# Patient Record
Sex: FEMALE | Race: WHITE | NOT HISPANIC OR LATINO | Employment: OTHER | URBAN - METROPOLITAN AREA
[De-identification: names, ages, dates, MRNs, and addresses within clinical notes are randomized per-mention and may not be internally consistent; named-entity substitution may affect disease eponyms.]

---

## 2023-01-01 ENCOUNTER — HOSPITAL ENCOUNTER (EMERGENCY)
Facility: CLINIC | Age: 53
Discharge: ANOTHER HOSPITAL | End: 2023-09-03
Attending: EMERGENCY MEDICINE | Admitting: EMERGENCY MEDICINE
Payer: COMMERCIAL

## 2023-01-01 ENCOUNTER — APPOINTMENT (OUTPATIENT)
Dept: CT IMAGING | Facility: CLINIC | Age: 53
End: 2023-01-01
Attending: EMERGENCY MEDICINE
Payer: COMMERCIAL

## 2023-01-01 ENCOUNTER — APPOINTMENT (OUTPATIENT)
Dept: GENERAL RADIOLOGY | Facility: CLINIC | Age: 53
End: 2023-01-01
Attending: EMERGENCY MEDICINE
Payer: COMMERCIAL

## 2023-01-01 VITALS
DIASTOLIC BLOOD PRESSURE: 90 MMHG | TEMPERATURE: 98.6 F | SYSTOLIC BLOOD PRESSURE: 128 MMHG | WEIGHT: 158 LBS | RESPIRATION RATE: 33 BRPM | OXYGEN SATURATION: 95 % | HEART RATE: 90 BPM

## 2023-01-01 DIAGNOSIS — I26.99 ACUTE PULMONARY EMBOLISM, UNSPECIFIED PULMONARY EMBOLISM TYPE, UNSPECIFIED WHETHER ACUTE COR PULMONALE PRESENT (H): ICD-10-CM

## 2023-01-01 DIAGNOSIS — J96.01 ACUTE RESPIRATORY FAILURE WITH HYPOXIA (H): ICD-10-CM

## 2023-01-01 DIAGNOSIS — J18.9 PNEUMONIA OF BOTH LUNGS DUE TO INFECTIOUS ORGANISM, UNSPECIFIED PART OF LUNG: ICD-10-CM

## 2023-01-01 LAB
ALBUMIN SERPL BCG-MCNC: 2.7 G/DL (ref 3.5–5.2)
ALP SERPL-CCNC: 227 U/L (ref 35–104)
ALT SERPL W P-5'-P-CCNC: 64 U/L (ref 0–50)
ANION GAP SERPL CALCULATED.3IONS-SCNC: 13 MMOL/L (ref 7–15)
AST SERPL W P-5'-P-CCNC: 118 U/L (ref 0–45)
BASE EXCESS BLDV CALC-SCNC: 3.3 MMOL/L (ref -7.7–1.9)
BASOPHILS # BLD AUTO: 0 10E3/UL (ref 0–0.2)
BASOPHILS NFR BLD AUTO: 0 %
BILIRUB SERPL-MCNC: 1 MG/DL
BUN SERPL-MCNC: 20.2 MG/DL (ref 6–20)
CALCIUM SERPL-MCNC: 8.9 MG/DL (ref 8.6–10)
CHLORIDE SERPL-SCNC: 100 MMOL/L (ref 98–107)
CREAT SERPL-MCNC: 0.9 MG/DL (ref 0.51–0.95)
D DIMER PPP FEU-MCNC: 1.16 UG/ML FEU (ref 0–0.5)
DEPRECATED HCO3 PLAS-SCNC: 24 MMOL/L (ref 22–29)
EOSINOPHIL # BLD AUTO: 0 10E3/UL (ref 0–0.7)
EOSINOPHIL NFR BLD AUTO: 0 %
ERYTHROCYTE [DISTWIDTH] IN BLOOD BY AUTOMATED COUNT: 17.7 % (ref 10–15)
FLUAV RNA SPEC QL NAA+PROBE: NEGATIVE
FLUBV RNA RESP QL NAA+PROBE: NEGATIVE
GFR SERPL CREATININE-BSD FRML MDRD: 76 ML/MIN/1.73M2
GLUCOSE SERPL-MCNC: 127 MG/DL (ref 70–99)
HCO3 BLDV-SCNC: 28 MMOL/L (ref 21–28)
HCT VFR BLD AUTO: 36.4 % (ref 35–47)
HGB BLD-MCNC: 11.6 G/DL (ref 11.7–15.7)
HOLD SPECIMEN: NORMAL
IMM GRANULOCYTES # BLD: 0.1 10E3/UL
IMM GRANULOCYTES NFR BLD: 1 %
INR PPP: 1.14 (ref 0.85–1.15)
LYMPHOCYTES # BLD AUTO: 0.7 10E3/UL (ref 0.8–5.3)
LYMPHOCYTES NFR BLD AUTO: 9 %
MAGNESIUM SERPL-MCNC: 1.9 MG/DL (ref 1.7–2.3)
MCH RBC QN AUTO: 31.8 PG (ref 26.5–33)
MCHC RBC AUTO-ENTMCNC: 31.9 G/DL (ref 31.5–36.5)
MCV RBC AUTO: 100 FL (ref 78–100)
MONOCYTES # BLD AUTO: 1 10E3/UL (ref 0–1.3)
MONOCYTES NFR BLD AUTO: 13 %
NEUTROPHILS # BLD AUTO: 6.1 10E3/UL (ref 1.6–8.3)
NEUTROPHILS NFR BLD AUTO: 77 %
NRBC # BLD AUTO: 0.1 10E3/UL
NRBC BLD AUTO-RTO: 1 /100
NT-PROBNP SERPL-MCNC: 6687 PG/ML (ref 0–900)
O2/TOTAL GAS SETTING VFR VENT: 50 %
PCO2 BLDV: 44 MM HG (ref 40–50)
PH BLDV: 7.41 [PH] (ref 7.32–7.43)
PLATELET # BLD AUTO: 208 10E3/UL (ref 150–450)
PO2 BLDV: 35 MM HG (ref 25–47)
POTASSIUM SERPL-SCNC: 4.8 MMOL/L (ref 3.4–5.3)
PROCALCITONIN SERPL IA-MCNC: 0.26 NG/ML
PROT SERPL-MCNC: 5.6 G/DL (ref 6.4–8.3)
RADIOLOGIST FLAGS: ABNORMAL
RBC # BLD AUTO: 3.65 10E6/UL (ref 3.8–5.2)
RSV RNA SPEC NAA+PROBE: NEGATIVE
SARS-COV-2 RNA RESP QL NAA+PROBE: NEGATIVE
SODIUM SERPL-SCNC: 137 MMOL/L (ref 136–145)
TROPONIN T SERPL HS-MCNC: 148 NG/L
TSH SERPL DL<=0.005 MIU/L-ACNC: 0.94 UIU/ML (ref 0.3–4.2)
WBC # BLD AUTO: 7.9 10E3/UL (ref 4–11)

## 2023-01-01 PROCEDURE — 250N000009 HC RX 250: Performed by: EMERGENCY MEDICINE

## 2023-01-01 PROCEDURE — 83880 ASSAY OF NATRIURETIC PEPTIDE: CPT | Performed by: EMERGENCY MEDICINE

## 2023-01-01 PROCEDURE — 96368 THER/DIAG CONCURRENT INF: CPT

## 2023-01-01 PROCEDURE — 84443 ASSAY THYROID STIM HORMONE: CPT | Performed by: EMERGENCY MEDICINE

## 2023-01-01 PROCEDURE — 84145 PROCALCITONIN (PCT): CPT | Performed by: EMERGENCY MEDICINE

## 2023-01-01 PROCEDURE — 84484 ASSAY OF TROPONIN QUANT: CPT | Performed by: EMERGENCY MEDICINE

## 2023-01-01 PROCEDURE — 85379 FIBRIN DEGRADATION QUANT: CPT | Performed by: EMERGENCY MEDICINE

## 2023-01-01 PROCEDURE — 96375 TX/PRO/DX INJ NEW DRUG ADDON: CPT | Mod: 59

## 2023-01-01 PROCEDURE — 85025 COMPLETE CBC W/AUTO DIFF WBC: CPT | Performed by: EMERGENCY MEDICINE

## 2023-01-01 PROCEDURE — 71275 CT ANGIOGRAPHY CHEST: CPT

## 2023-01-01 PROCEDURE — 94640 AIRWAY INHALATION TREATMENT: CPT

## 2023-01-01 PROCEDURE — 999N000123 HC STATISTIC OXYGEN O2DAILY TECH TIME

## 2023-01-01 PROCEDURE — 99291 CRITICAL CARE FIRST HOUR: CPT | Mod: 25

## 2023-01-01 PROCEDURE — 96366 THER/PROPH/DIAG IV INF ADDON: CPT

## 2023-01-01 PROCEDURE — 93010 ELECTROCARDIOGRAM REPORT: CPT | Performed by: EMERGENCY MEDICINE

## 2023-01-01 PROCEDURE — 36415 COLL VENOUS BLD VENIPUNCTURE: CPT | Performed by: EMERGENCY MEDICINE

## 2023-01-01 PROCEDURE — 999N000157 HC STATISTIC RCP TIME EA 10 MIN

## 2023-01-01 PROCEDURE — 85610 PROTHROMBIN TIME: CPT | Performed by: EMERGENCY MEDICINE

## 2023-01-01 PROCEDURE — 96365 THER/PROPH/DIAG IV INF INIT: CPT | Mod: 59

## 2023-01-01 PROCEDURE — 83735 ASSAY OF MAGNESIUM: CPT | Performed by: EMERGENCY MEDICINE

## 2023-01-01 PROCEDURE — 93005 ELECTROCARDIOGRAM TRACING: CPT

## 2023-01-01 PROCEDURE — 250N000009 HC RX 250

## 2023-01-01 PROCEDURE — 82803 BLOOD GASES ANY COMBINATION: CPT | Performed by: EMERGENCY MEDICINE

## 2023-01-01 PROCEDURE — 99292 CRITICAL CARE ADDL 30 MIN: CPT

## 2023-01-01 PROCEDURE — 999N000156 HC STATISTIC RCP CONSULT EA 30 MIN

## 2023-01-01 PROCEDURE — 87637 SARSCOV2&INF A&B&RSV AMP PRB: CPT | Performed by: EMERGENCY MEDICINE

## 2023-01-01 PROCEDURE — 250N000011 HC RX IP 250 OP 636: Performed by: EMERGENCY MEDICINE

## 2023-01-01 PROCEDURE — 99285 EMERGENCY DEPT VISIT HI MDM: CPT | Mod: 25 | Performed by: EMERGENCY MEDICINE

## 2023-01-01 PROCEDURE — 71045 X-RAY EXAM CHEST 1 VIEW: CPT

## 2023-01-01 PROCEDURE — 94660 CPAP INITIATION&MGMT: CPT

## 2023-01-01 PROCEDURE — 80053 COMPREHEN METABOLIC PANEL: CPT | Performed by: EMERGENCY MEDICINE

## 2023-01-01 RX ORDER — IPRATROPIUM BROMIDE AND ALBUTEROL SULFATE 2.5; .5 MG/3ML; MG/3ML
3 SOLUTION RESPIRATORY (INHALATION)
Status: COMPLETED | OUTPATIENT
Start: 2023-01-01 | End: 2023-01-01

## 2023-01-01 RX ORDER — IOPAMIDOL 755 MG/ML
500 INJECTION, SOLUTION INTRAVASCULAR ONCE
Status: COMPLETED | OUTPATIENT
Start: 2023-01-01 | End: 2023-01-01

## 2023-01-01 RX ORDER — PREGABALIN 100 MG/1
100 CAPSULE ORAL 2 TIMES DAILY
COMMUNITY
Start: 2023-01-01

## 2023-01-01 RX ORDER — FUROSEMIDE 10 MG/ML
40 INJECTION INTRAMUSCULAR; INTRAVENOUS ONCE
Status: COMPLETED | OUTPATIENT
Start: 2023-01-01 | End: 2023-01-01

## 2023-01-01 RX ORDER — SENNOSIDES A AND B 8.6 MG/1
8.6 TABLET, FILM COATED ORAL DAILY
COMMUNITY
Start: 2023-01-01

## 2023-01-01 RX ORDER — ONDANSETRON 2 MG/ML
8 INJECTION INTRAMUSCULAR; INTRAVENOUS ONCE
Status: COMPLETED | OUTPATIENT
Start: 2023-01-01 | End: 2023-01-01

## 2023-01-01 RX ORDER — HYDROMORPHONE HYDROCHLORIDE 2 MG/1
2 TABLET ORAL EVERY 6 HOURS PRN
COMMUNITY
Start: 2023-01-01

## 2023-01-01 RX ORDER — AZITHROMYCIN 500 MG/1
500 INJECTION, POWDER, LYOPHILIZED, FOR SOLUTION INTRAVENOUS EVERY 24 HOURS
Status: COMPLETED | OUTPATIENT
Start: 2023-01-01 | End: 2023-01-01

## 2023-01-01 RX ORDER — MEMANTINE HYDROCHLORIDE 5 MG/1
5 TABLET ORAL 2 TIMES DAILY
COMMUNITY
Start: 2023-01-01 | End: 2024-03-08

## 2023-01-01 RX ORDER — IPRATROPIUM BROMIDE AND ALBUTEROL SULFATE 2.5; .5 MG/3ML; MG/3ML
3 SOLUTION RESPIRATORY (INHALATION) EVERY 4 HOURS PRN
Status: DISCONTINUED | OUTPATIENT
Start: 2023-01-01 | End: 2023-01-01 | Stop reason: HOSPADM

## 2023-01-01 RX ORDER — PROCHLORPERAZINE MALEATE 10 MG
10 TABLET ORAL EVERY 6 HOURS PRN
COMMUNITY
Start: 2023-01-01 | End: 2024-06-25

## 2023-01-01 RX ORDER — ACETAMINOPHEN 500 MG
500 TABLET ORAL EVERY 4 HOURS PRN
COMMUNITY

## 2023-01-01 RX ORDER — DEXAMETHASONE SODIUM PHOSPHATE 10 MG/ML
10 INJECTION, SOLUTION INTRAMUSCULAR; INTRAVENOUS ONCE
Status: COMPLETED | OUTPATIENT
Start: 2023-01-01 | End: 2023-01-01

## 2023-01-01 RX ORDER — DEXAMETHASONE 1 MG
1 TABLET ORAL DAILY
COMMUNITY
Start: 2023-01-01

## 2023-01-01 RX ORDER — IPRATROPIUM BROMIDE AND ALBUTEROL SULFATE 2.5; .5 MG/3ML; MG/3ML
SOLUTION RESPIRATORY (INHALATION)
Status: COMPLETED
Start: 2023-01-01 | End: 2023-01-01

## 2023-01-01 RX ORDER — EPINEPHRINE 0.3 MG/.3ML
0.3 INJECTION SUBCUTANEOUS PRN
COMMUNITY
Start: 2022-02-22

## 2023-01-01 RX ORDER — LIDOCAINE/PRILOCAINE 2.5 %-2.5%
CREAM (GRAM) TOPICAL PRN
COMMUNITY
Start: 2023-01-01

## 2023-01-01 RX ORDER — METHYLPREDNISOLONE SODIUM SUCCINATE 125 MG/2ML
125 INJECTION, POWDER, LYOPHILIZED, FOR SOLUTION INTRAMUSCULAR; INTRAVENOUS ONCE
Status: COMPLETED | OUTPATIENT
Start: 2023-01-01 | End: 2023-01-01

## 2023-01-01 RX ORDER — CEFEPIME HYDROCHLORIDE 2 G/1
2 INJECTION, POWDER, FOR SOLUTION INTRAVENOUS EVERY 8 HOURS
Status: DISCONTINUED | OUTPATIENT
Start: 2023-01-01 | End: 2023-01-01 | Stop reason: HOSPADM

## 2023-01-01 RX ORDER — HEPARIN SODIUM 10000 [USP'U]/100ML
0-5000 INJECTION, SOLUTION INTRAVENOUS CONTINUOUS
Status: DISCONTINUED | OUTPATIENT
Start: 2023-01-01 | End: 2023-01-01 | Stop reason: HOSPADM

## 2023-01-01 RX ORDER — CITALOPRAM HYDROBROMIDE 20 MG/1
20 TABLET ORAL DAILY
COMMUNITY
Start: 2023-01-01 | End: 2024-04-23

## 2023-01-01 RX ORDER — ONDANSETRON 8 MG/1
8 TABLET, FILM COATED ORAL EVERY 6 HOURS PRN
COMMUNITY
Start: 2023-01-01 | End: 2024-04-02

## 2023-01-01 RX ORDER — HYDROMORPHONE HYDROCHLORIDE 4 MG/1
4 TABLET ORAL EVERY 6 HOURS PRN
COMMUNITY
Start: 2023-01-01

## 2023-01-01 RX ORDER — FENTANYL 25 UG/1
2 PATCH TRANSDERMAL
COMMUNITY
Start: 2023-01-01

## 2023-01-01 RX ORDER — LIDOCAINE 50 MG/G
PATCH TOPICAL PRN
COMMUNITY
Start: 2022-01-01

## 2023-01-01 RX ADMIN — IPRATROPIUM BROMIDE AND ALBUTEROL SULFATE 3 ML: .5; 3 SOLUTION RESPIRATORY (INHALATION) at 09:39

## 2023-01-01 RX ADMIN — SODIUM CHLORIDE 70 ML: 9 INJECTION, SOLUTION INTRAVENOUS at 10:40

## 2023-01-01 RX ADMIN — HEPARIN SODIUM 1300 UNITS/HR: 10000 INJECTION, SOLUTION INTRAVENOUS at 11:48

## 2023-01-01 RX ADMIN — CEFEPIME HYDROCHLORIDE 2 G: 2 INJECTION, POWDER, FOR SOLUTION INTRAVENOUS at 10:53

## 2023-01-01 RX ADMIN — IOPAMIDOL 65 ML: 755 INJECTION, SOLUTION INTRAVENOUS at 10:41

## 2023-01-01 RX ADMIN — IPRATROPIUM BROMIDE AND ALBUTEROL SULFATE 3 ML: .5; 3 SOLUTION RESPIRATORY (INHALATION) at 09:44

## 2023-01-01 RX ADMIN — ONDANSETRON 8 MG: 2 INJECTION INTRAMUSCULAR; INTRAVENOUS at 12:39

## 2023-01-01 RX ADMIN — AZITHROMYCIN MONOHYDRATE 500 MG: 500 INJECTION, POWDER, LYOPHILIZED, FOR SOLUTION INTRAVENOUS at 10:17

## 2023-01-01 RX ADMIN — FUROSEMIDE 40 MG: 10 INJECTION, SOLUTION INTRAVENOUS at 10:55

## 2023-01-01 RX ADMIN — DEXAMETHASONE SODIUM PHOSPHATE 10 MG: 10 INJECTION, SOLUTION INTRAMUSCULAR; INTRAVENOUS at 11:37

## 2023-01-01 RX ADMIN — METHYLPREDNISOLONE SODIUM SUCCINATE 125 MG: 125 INJECTION INTRAMUSCULAR; INTRAVENOUS at 09:20

## 2023-01-01 RX ADMIN — IPRATROPIUM BROMIDE AND ALBUTEROL SULFATE 3 ML: .5; 3 SOLUTION RESPIRATORY (INHALATION) at 09:46

## 2023-01-01 ASSESSMENT — ACTIVITIES OF DAILY LIVING (ADL)
ADLS_ACUITY_SCORE: 35
ADLS_ACUITY_SCORE: 35

## 2023-09-03 NOTE — MEDICATION SCRIBE - ADMISSION MEDICATION HISTORY
Medication Scribe Admission Medication History    Admission medication history is complete. The information provided in this note is only as accurate as the sources available at the time of the update.    Medication reconciliation/reorder completed by provider prior to medication history? No    Information Source(s): Family member Spouse Claude via in-person    Pertinent Information:      Changes made to PTA medication list:  Added: PTA med list added from reconcile list, confirmed by patient's spouse Claude   Deleted: None  Changed: None    Medication Affordability:  Not including over the counter (OTC) medications, was there a time in the past 3 months when you did not take your medications as prescribed because of cost?: Unable to Assess    Allergies reviewed with patient and updates made in EHR: yes, with Family     Medication History Completed By: ROMY JOHNSON 9/3/2023 12:28 PM    Prior to Admission medications    Medication Sig Last Dose Taking? Auth Provider Long Term End Date   acetaminophen (TYLENOL) 500 MG tablet Take 500 mg by mouth every 4 hours as needed for mild pain 8/31/2023 at pm Yes Reported, Patient     citalopram (CELEXA) 20 MG tablet Take 20 mg by mouth daily 9/2/2023 at am Yes Reported, Patient Yes 4/23/24   dexAMETHasone (DECADRON) 1 MG tablet Take 1 mg by mouth daily 9/2/2023 at am Yes Reported, Patient Yes    EPINEPHrine (ANY BX GENERIC EQUIV) 0.3 MG/0.3ML injection 2-pack Inject 0.3 mg into the muscle as needed for anaphylaxis on hand at not used Yes Reported, Patient     fentaNYL (DURAGESIC) 25 mcg/hr 72 hr patch Place 2 patches onto the skin every 72 hours 9/1/2023 at am Yes Reported, Patient     HYDROmorphone (DILAUDID) 2 MG tablet Take 2 mg by mouth every 6 hours as needed for moderate to severe pain 9/2/2023 at hs Yes Reported, Patient     HYDROmorphone (DILAUDID) 4 MG tablet Take 4 mg by mouth every 6 hours as needed for moderate pain or moderate to severe pain 9/2/2023 at hs Yes  Reported, Patient     lidocaine (LIDODERM) 5 % patch Place onto the skin as needed for moderate pain More than a month at on hand Yes Reported, Patient     lidocaine-prilocaine (EMLA) 2.5-2.5 % external cream Apply topically as needed for moderate pain or other (pre chemotherapy session on port) Past Month at on hand Yes Reported, Patient Yes    memantine (NAMENDA) 5 MG tablet Take 5 mg by mouth 2 times daily 9/2/2023 at hs Yes Reported, Patient  3/8/24   naloxone (NARCAN) 4 MG/0.1ML nasal spray Spray 4 mg in nostril once as needed for opioid reversal on hand at mot used Yes Reported, Patient Yes    ondansetron (ZOFRAN) 8 MG tablet Take 8 mg by mouth every 6 hours as needed for nausea or other (Vomiting) 9/2/2023 at hs Yes Reported, Patient  4/2/24   pregabalin (LYRICA) 100 MG capsule Take 100 mg by mouth 2 times daily 9/2/2023 at hs Yes Reported, Patient Yes    prochlorperazine (COMPAZINE) 10 MG tablet Take 10 mg by mouth every 6 hours as needed for nausea or vomiting 9/2/2023 at hs Yes Reported, Patient  6/25/24   senna (SENOKOT) 8.6 MG tablet Take 8.6 mg by mouth daily 9/2/2023 at am Yes Reported, Patient

## 2023-09-03 NOTE — PROGRESS NOTES
09/03/23 0949 09/03/23 1059   CPAP/BiPAP/Settings   IPAP/EPAP (cmH2O) 12/5 12/5   Rate (breaths/min) 16 16   Oxygen (%) 50 40   Timed Inspiration (sec) 1 1   IPAP RISE  Settings (V60) 1 1   CPAP/BiPAP Patient Parameters   IPAP (cm H2O) 12 cmH2O 12 cmH2O   EPAP (cm H2O) 5 cmH2O 5 cmH2O   Pressure Support (cm H2O) 7 cmH2O 7 cmH2O   RR Total (breaths/min) 24 breaths/min 26 breaths/min   Vt (mL) 472 mL 418 mL   Minute Ventilation (L/min) 11.1 L/min 10.8 L/min   Pt.  Leak (L/min) 13.2 L/min 47 L/min   CPAP/BiPAP/AVAPS/AVAPS AE Alarms   High Pressure (cm H2O) 30 cmH2O  --    Low Pressure (cm H2O) 10  --    Low Pressure Delay (sec) 20 sec  --    Lo Min Vent 20  --    High Rate (breaths/min) 40 breaths/min  --    Low Rate (breaths/min) 12  --    Audible Alarm set at (Volume of Alarm) 5  --    RT Device Skin Assessment   Oxygen Delivery Device CPAP/BiPAP Mask  --    Interface Face Mask - Medium  --    Ventilator Arm In Place No  --    Site Appearance neck circumference Clean and dry  --    Site Appearance bridge of nose Clean and dry  --    Site Appearance occiput Clean and dry  --    Strap Tightness Other (Comment)  --    Respiratory WDL   Respiratory WDL X;effort/expansion;breath sounds  --    Ambu at Bedside present  --    Breath Sounds   Breath Sounds All Fields  --    All Lung Fields Breath Sounds diminished  --      Patient transported to CT and back to room on BIPAP with no complications

## 2023-09-03 NOTE — PROGRESS NOTES
09/03/23 0900   Mode: CPAP/ BiPAP/ AVAPS/ AVAPS AE   CPAP/BiPAP/ AVAPS/ AVAPS AE Mode BiPAP S/T   Equipment   Device V60   CPAP/BiPAP/Settings   $CPAP/BiPAP Initial completed   BIPAP/CPAP On Standby On   IPAP/EPAP (cmH2O) 12/5   Rate (breaths/min) 16   Oxygen (%) 50   Timed Inspiration (sec) 1   IPAP RISE  Settings (V60) 1   CPAP/BiPAP Patient Parameters   IPAP (cm H2O) 12 cmH2O   EPAP (cm H2O) 5 cmH2O   Pressure Support (cm H2O) 7 cmH2O   RR Total (breaths/min) 35 breaths/min   Vt (mL) 531 mL   Minute Ventilation (L/min) 21 L/min   Pt.  Leak (L/min) 13 L/min   CPAP/BiPAP/AVAPS/AVAPS AE Alarms   High Pressure (cm H2O) 30 cmH2O   Low Pressure (cm H2O) 10   Low Pressure Delay (sec) 20 sec   Lo Min Vent 20   High Rate (breaths/min) 40 breaths/min   Low Rate (breaths/min) 12   Audible Alarm set at (Volume of Alarm) 5     Patient is requiring BIPAP support for respiratory support  Breath sounds wheezing   Duo Neb given.

## 2023-09-04 NOTE — ED PROVIDER NOTES
"  History     Chief Complaint   Patient presents with    Shortness of Breath     HPI  History per patient, medical records,     This is a 53-year-old female, history of breast cancer with metastases to pleura, liver, bone, brain with leptomeningeal disease presenting with shortness of breath.  Per patient's , patient had oxygen saturations in the high 80s when seen in palliative care clinic at [facility] 2 days ago.  On a recheck O2 sats were in the low 90s.  They elected to not to go to the ED but return home.  They traveled to see some friends this weekend.  Patient had a slight cough yesterday.  Today she was noted to be very weak and short of breath.  EMS was called and patient was noted to have low oxygen saturations \"57%\".  She was placed on O2 but they were unable to maintain adequate saturation so she was then placed on BiPAP and brought to the ED.  Patient denies any fevers.  No history of lung disease, O2 use, inhalers.  Her cough has been nonproductive.  She denies chest pain.  Her hip pain is at baseline.  She denies nausea or vomiting.  She has been closely monitored within the [facility] so she has a chance for sick contacts with her MD visits.  Patient has a PowerPort in place.    Patient is status post double mastectomy.  She has been receiving whole brain radiation and has undergone courses of chemotherapy and radiation.  She has chronic pain and is on fentanyl patches, oral Dilaudid, pregabalin and dexamethasone.    Patient recently had shingles of the right side of her face.        Allergies:  Allergies   Allergen Reactions    Bees     Amoxicillin     Sulfa Antibiotics        Problem List:    There are no problems to display for this patient.       Past Medical History:    No past medical history on file.    Past Surgical History:    No past surgical history on file.    Family History:    No family history on file.    Social History:  Marital Status:   [2]    "     Medications:    acetaminophen (TYLENOL) 500 MG tablet  citalopram (CELEXA) 20 MG tablet  dexAMETHasone (DECADRON) 1 MG tablet  EPINEPHrine (ANY BX GENERIC EQUIV) 0.3 MG/0.3ML injection 2-pack  fentaNYL (DURAGESIC) 25 mcg/hr 72 hr patch  HYDROmorphone (DILAUDID) 2 MG tablet  HYDROmorphone (DILAUDID) 4 MG tablet  lidocaine (LIDODERM) 5 % patch  lidocaine-prilocaine (EMLA) 2.5-2.5 % external cream  memantine (NAMENDA) 5 MG tablet  naloxone (NARCAN) 4 MG/0.1ML nasal spray  ondansetron (ZOFRAN) 8 MG tablet  pregabalin (LYRICA) 100 MG capsule  prochlorperazine (COMPAZINE) 10 MG tablet  senna (SENOKOT) 8.6 MG tablet          Review of Systems  All other ROS reviewed and are negative or non-contributory except as stated in HPI.     Physical Exam   BP: 115/81  Pulse: (!) 126  Temp: 98.6  F (37  C)  Resp: (!) 35  Weight: 71.7 kg (158 lb)  SpO2: (!) 70 %      Physical Exam  Vitals and nursing note reviewed.   Constitutional:       Appearance: She is well-developed and normal weight.      Comments: Patient brought in by EMS on BiPAP.  She was immediately transferred over to our BiPAP machine.  She is obviously dyspneic but able to speak, awake and alert.   Eyes:      Comments: Conjunctival edema on the right, mild conjunctival edema on the left   Cardiovascular:      Rate and Rhythm: Regular rhythm. Tachycardia present.      Pulses: Normal pulses.      Heart sounds: Normal heart sounds.   Pulmonary:      Comments: Bilateral mastectomy scars  Tachypneic.  Wheezing heard.  Crackles at the bases.  Musculoskeletal:         General: Normal range of motion.      Cervical back: Normal range of motion and neck supple.      Comments: Bilateral mild pitting pedal edema   Skin:     General: Skin is warm and dry.      Comments: Alopecia   Neurological:      General: No focal deficit present.      Mental Status: She is alert.   Psychiatric:         Behavior: Behavior normal.         ED Course (with Medical Decision Making)    Pt seen  and examined by me.  RN and EPIC notes reviewed.       Patient with metastatic breast cancer presenting with acute respiratory failure and hypoxia.  She denies significant chest pain.  No history of asthma or COPD.  Her  later noted that she had interstitial lung disease from her chemotherapy.  Concern for pneumonia, PE, CHF, other cause.  She denies significant chest pain but could have cardiac cause.    Patient placed on BiPAP, RT in the ED.  She was given a DuoNeb.  Port was accessed.  Labs including blood cultures drawn.  Portable chest x-ray done.    Chest x-ray shows right Port-A-Cath.  Moderate right pleural effusion with associated infiltrate or atelectasis at the right lung base.  Ill-defined opacity in the left mid and upper lung, possible infectious.  No pneumothorax.    Patient apparently had a pleural effusion in the past.  No issues with recurrent pleural effusions.    I elected to start antibiotics, Zithromax and cefepime for possible hospital-acquired pneumonia.    EKG was done.  This shows sinus tachycardia with a rate of 110.  Short HI interval noted at 118.  Left atrial enlargement.  There is a tiny bit of ST elevation in lead V3 but I do not see elevation in contiguous leads.  A little bit of slurring in leads I and lead II and aVL.  No previous EKG to compare.  Read by myself at 9:26 AM.  I gave patient a dose of Solu-Medrol in addition to her nebulizer treatments.    Labs returned with mildly elevated BUN/creatinine at 20.2/0.9, electrolytes unremarkable.  Glucose 127.  Alk phosphatase slightly high at 227, , ALT 64.  She has elevated D-dimer at 1.16  Elevated troponin at 148  Elevated BNP at 6687  VBG 7.4 1/41/35/28  Normal white count 7.9.  Hemoglobin 11.6, normal platelets.    I did a CT scan of the chest to evaluate for possible PE.  Radiologist called with concerns for possible distal pulmonary artery emboli, consider associated right heart strain with some prominence of the  right heart.  Scattered indeterminate bilateral pulmonary nodules concerning for metastatic disease.  Dense consolidation at the right lung base and extensive groundglass opacities in the left lung infectious versus inflammatory.  Small bilateral pleural effusions.    Patient doctors through [facility] exclusively.  I think it would be best if she is transferred to their care, calls were made.  I spoke with the oncology intensivist and he was gracious to accept her for transfer.  I started her on heparin for the PE findings.  She has received the IV antibiotics and a dose of Solu-Medrol.  I also gave her a dose of Decadron as she is on chronic steroids.  With the elevated BNP I also gave her a dose of Lasix 40 mg.  She had mild nausea and was given Zofran prior to transfer.  We are transferring her via helicopter as if she is on BiPAP and there are no legs available that can travel that distance with enough oxygen for patient on BiPAP.    Patient conversant, more comfortable.  She and her  are aware of the plan.     Results for orders placed or performed during the hospital encounter of 09/03/23 (from the past 24 hour(s))   CBC with platelets differential    Narrative    The following orders were created for panel order CBC with platelets differential.  Procedure                               Abnormality         Status                     ---------                               -----------         ------                     CBC with platelets and d...[[id number]]  Abnormal            Final result                 Please view results for these tests on the individual orders.   Comprehensive metabolic panel   Result Value Ref Range    Sodium 137 136 - 145 mmol/L    Potassium 4.8 3.4 - 5.3 mmol/L    Chloride 100 98 - 107 mmol/L    Carbon Dioxide (CO2) 24 22 - 29 mmol/L    Anion Gap 13 7 - 15 mmol/L    Urea Nitrogen 20.2 (H) 6.0 - 20.0 mg/dL    Creatinine 0.90 0.51 - 0.95 mg/dL    Calcium 8.9 8.6 - 10.0 mg/dL     Glucose 127 (H) 70 - 99 mg/dL    Alkaline Phosphatase 227 (H) 35 - 104 U/L     (H) 0 - 45 U/L    ALT 64 (H) 0 - 50 U/L    Protein Total 5.6 (L) 6.4 - 8.3 g/dL    Albumin 2.7 (L) 3.5 - 5.2 g/dL    Bilirubin Total 1.0 <=1.2 mg/dL    GFR Estimate 76 >60 mL/min/1.73m2   Magnesium   Result Value Ref Range    Magnesium 1.9 1.7 - 2.3 mg/dL   INR   Result Value Ref Range    INR 1.14 0.85 - 1.15   D dimer quantitative   Result Value Ref Range    D-Dimer Quantitative 1.16 (H) 0.00 - 0.50 ug/mL FEU    Narrative    This D-dimer assay is intended for use in conjunction with a clinical pretest probability assessment model to exclude pulmonary embolism (PE) and deep venous thrombosis (DVT) in outpatients suspected of PE or DVT. The cut-off value is 0.50 ug/mL FEU.   Troponin T, High Sensitivity   Result Value Ref Range    Troponin T, High Sensitivity 148 (HH) <=14 ng/L   Nt probnp inpatient   Result Value Ref Range    N terminal Pro BNP Inpatient 6,687 (H) 0 - 900 pg/mL   TSH with free T4 reflex   Result Value Ref Range    TSH 0.94 0.30 - 4.20 uIU/mL   Blood gas venous   Result Value Ref Range    pH Venous 7.41 7.32 - 7.43    pCO2 Venous 44 40 - 50 mm Hg    pO2 Venous 35 25 - 47 mm Hg    Bicarbonate Venous 28 21 - 28 mmol/L    Base Excess/Deficit 3.3 (H) -7.7 - 1.9 mmol/L    FIO2 50    CBC with platelets and differential   Result Value Ref Range    WBC Count 7.9 4.0 - 11.0 10e3/uL    RBC Count 3.65 (L) 3.80 - 5.20 10e6/uL    Hemoglobin 11.6 (L) 11.7 - 15.7 g/dL    Hematocrit 36.4 35.0 - 47.0 %     78 - 100 fL    MCH 31.8 26.5 - 33.0 pg    MCHC 31.9 31.5 - 36.5 g/dL    RDW 17.7 (H) 10.0 - 15.0 %    Platelet Count 208 150 - 450 10e3/uL    % Neutrophils 77 %    % Lymphocytes 9 %    % Monocytes 13 %    % Eosinophils 0 %    % Basophils 0 %    % Immature Granulocytes 1 %    NRBCs per 100 WBC 1 (H) <1 /100    Absolute Neutrophils 6.1 1.6 - 8.3 10e3/uL    Absolute Lymphocytes 0.7 (L) 0.8 - 5.3 10e3/uL    Absolute Monocytes  1.0 0.0 - 1.3 10e3/uL    Absolute Eosinophils 0.0 0.0 - 0.7 10e3/uL    Absolute Basophils 0.0 0.0 - 0.2 10e3/uL    Absolute Immature Granulocytes 0.1 <=0.4 10e3/uL    Absolute NRBCs 0.1 10e3/uL   Procalcitonin   Result Value Ref Range    Procalcitonin 0.26 (H) <0.05 ng/mL   [place] Draw    Narrative    The following orders were created for panel order [place] Draw.  Procedure                               Abnormality         Status                     ---------                               -----------         ------                     Extra Blood Culture Bottle[[id number]]                       Final result               Extra Red Top Tube[[id number]]                               Final result               Extra Purple Top Tube[[id number]]                            Final result                 Please view results for these tests on the individual orders.   Extra Blood Culture Bottle   Result Value Ref Range    Hold Specimen JIC    Extra Red Top Tube   Result Value Ref Range    Hold Specimen JIC    Extra Purple Top Tube   Result Value Ref Range    Hold Specimen JIC    [place] Draw    Narrative    The following orders were created for panel order [place] Draw.  Procedure                               Abnormality         Status                     ---------                               -----------         ------                     Extra Blood Culture Bottle[[id number]]                       Final result                 Please view results for these tests on the individual orders.   Extra Blood Culture Bottle   Result Value Ref Range    Hold Specimen JIC    XR Chest Port 1 View    Narrative    EXAM: XR CHEST PORT 1 VIEW  LOCATION: [facility]  DATE: 9/3/2023    INDICATION: Shortness of breath.  COMPARISON: None.      Impression    IMPRESSION: Right Port-A-Cath, with tip near the SVC/RA junction. Moderate-sized right pleural effusion, with associated infiltrate or atelectasis at the right  lung base. Ill-defined opacity in the left mid and upper lung may be infectious in etiology.   Pulmonary vascularity is within normal limits where visualized. Aortic calcification. There are numerous surgical clips in both axillary regions. No pneumothorax.   CT Chest Pulmonary Embolism w Contrast   Result Value Ref Range    Radiologist flags Pulmonary embolism (AA)     Narrative    EXAM: CT CHEST PULMONARY EMBOLISM WITH CONTRAST  LOCATION: [facility]  DATE: 09/03/2023    INDICATION: Dyspnea. Hypoxia. Elevated d-dimer. History of breast cancer.  COMPARISON: None.  TECHNIQUE: CT chest pulmonary angiogram during arterial phase injection of IV contrast. Multiplanar reformats and MIP reconstructions were performed. Dose reduction techniques were used.   CONTRAST: Isovue 370, 65 mL.    FINDINGS:  ANGIOGRAM CHEST: There are scattered filling defects within several distal pulmonary artery branches bilaterally, suspicious for pulmonary embolism. No large central pulmonary embolus is identified. Prominence of the right heart is noted, and some degree   of associated right heart strain cannot be excluded. No evidence for thoracic aortic aneurysm or dissection.    LUNGS AND PLEURA: Dense consolidation at the right lung base. Extensive predominantly groundglass opacity throughout the left lung, with relative sparing at the left lung base. There are scattered indeterminate bilateral pulmonary nodules, with the   largest in the left upper lobe measuring 1 cm (series 6 image 73). Small bilateral pleural effusions.    MEDIASTINUM/AXILLAE: No enlarged lymph nodes in the chest. Right Port-A-Cath, with tip in the low SVC. No pericardial effusion.    CORONARY ARTERY CALCIFICATION: None.    UPPER ABDOMEN: No significant findings.    MUSCULOSKELETAL: There is sclerosis and mild age-indeterminate anterior compression of the L2 vertebral body.      Impression    IMPRESSION:  1.  Scattered filling defects  within several distal pulmonary artery branches bilaterally, suspicious for pulmonary embolism.  2.  There is prominence of the right heart, and some degree of associated right heart strain cannot be excluded.  3.  Scattered indeterminate bilateral pulmonary nodules, suspicious for metastatic disease.  4.  Dense consolidation at the right lung base and extensive predominantly groundglass opacity in the left lung are nonspecific, but could be infectious or inflammatory in etiology.  5.  Small bilateral pleural effusions.      [Critical Result: Pulmonary embolism]    Finding was identified on 09/03/2023 at 11:33 AM CDT.     Dr. Pace was contacted by me on 09/03/2023 at 11:33 AM CDT and verbalized understanding of the critical result.      Symptomatic Influenza A/B, RSV, & SARS-CoV2 PCR (COVID-19) Nose    Specimen: Nose; Swab   Result Value Ref Range    Influenza A PCR Negative Negative    Influenza B PCR Negative Negative    RSV PCR Negative Negative    SARS CoV2 PCR Negative Negative    Narrative    Testing was performed using the Xpert Xpress CoV2/Flu/RSV Assay on the [employer] GeneXpert Instrument. This test should be ordered for the detection of SARS-CoV-2, influenza, and RSV viruses in individuals who meet clinical and/or epidemiological criteria. Test performance is unknown in asymptomatic patients. This test is for in vitro diagnostic use under the FDA EUA for laboratories certified under CLIA to perform high or moderate complexity testing. This test has not been FDA cleared or approved. A negative result does not rule out the presence of PCR inhibitors in the specimen or target RNA in concentration below the limit of detection for the assay. If only one viral target is positive but coinfection with multiple targets is suspected, the sample should be re-tested with another FDA cleared, approved, or authorized test, if coinfection would change clinical management. This test was validated by the  [facility]. These laboratories are certified under the Clinical Laboratory Improvement Amendments of 1988 (CLIA-88) as qualified to perform high complexity laboratory testing.       Medications   ipratropium - albuterol 0.5 mg/2.5 mg/3 mL (DUONEB) neb solution 3 mL (3 mLs Nebulization $Given 9/3/23 0946)   methylPREDNISolone sodium succinate (solu-MEDROL) injection 125 mg (125 mg Intravenous $Given 9/3/23 0920)   azithromycin (ZITHROMAX) 500 mg vial to attach to  mL bag (0 mg Intravenous Stopped 9/3/23 1212)   furosemide (LASIX) injection 40 mg (40 mg Intravenous $Given 9/3/23 1055)   iopamidol (ISOVUE-370) solution 500 mL (65 mLs Intravenous $Given 9/3/23 1041)   sodium chloride 0.9 % bag 100ml for CT scan flush use (70 mLs As instructed $Given 9/3/23 1040)   dexAMETHasone PF (DECADRON) injection 10 mg (10 mg Intravenous $Given 9/3/23 1137)   heparin ANTICOAGULANT Loading dose for HIGH INTENSITY TREATMENT * Give BEFORE starting heparin infusion (5,750 Units Intravenous $Given 9/3/23 1150)   ondansetron (ZOFRAN) injection 8 mg (8 mg Intravenous $Given 9/3/23 1239)       Assessments & Plan      I have reviewed the findings, diagnosis, plan with the patient.  Discharge Medication List as of 9/3/2023  1:05 PM          Final diagnoses:   Acute respiratory failure with hypoxia (H)   Acute pulmonary embolism, unspecified pulmonary embolism type, unspecified whether acute cor pulmonale present (H)   Pneumonia of both lungs due to infectious organism, unspecified part of lung     Disposition: Patient transferred to [facility] via helicopter ambulance    Note: Chart documentation done in part with Dragon Voice Recognition software. Although reviewed after completion, some word and grammatical errors may remain.     9/3/2023   [facility] EMERGENCY DEPT       Victoria Pace MD  09/04/23 [id number]